# Patient Record
Sex: MALE | Race: WHITE
[De-identification: names, ages, dates, MRNs, and addresses within clinical notes are randomized per-mention and may not be internally consistent; named-entity substitution may affect disease eponyms.]

---

## 2019-01-18 ENCOUNTER — HOSPITAL ENCOUNTER (OUTPATIENT)
Dept: HOSPITAL 80 - CIMAGING | Age: 39
Discharge: HOME | End: 2019-01-18
Attending: INTERNAL MEDICINE
Payer: COMMERCIAL

## 2019-01-18 DIAGNOSIS — R93.1: ICD-10-CM

## 2019-01-18 DIAGNOSIS — I51.7: ICD-10-CM

## 2019-01-18 DIAGNOSIS — R79.89: Primary | ICD-10-CM

## 2019-01-24 ENCOUNTER — HOSPITAL ENCOUNTER (INPATIENT)
Dept: HOSPITAL 80 - FCATH | Age: 39
LOS: 5 days | Discharge: HOME | DRG: 286 | End: 2019-01-29
Attending: INTERNAL MEDICINE | Admitting: INTERNAL MEDICINE
Payer: COMMERCIAL

## 2019-01-24 DIAGNOSIS — F10.20: ICD-10-CM

## 2019-01-24 DIAGNOSIS — G47.33: ICD-10-CM

## 2019-01-24 DIAGNOSIS — I50.21: ICD-10-CM

## 2019-01-24 DIAGNOSIS — I38: ICD-10-CM

## 2019-01-24 DIAGNOSIS — E66.9: ICD-10-CM

## 2019-01-24 DIAGNOSIS — E87.1: ICD-10-CM

## 2019-01-24 DIAGNOSIS — F11.21: ICD-10-CM

## 2019-01-24 DIAGNOSIS — I11.0: Primary | ICD-10-CM

## 2019-01-24 LAB
INR PPP: 1.27 (ref 0.83–1.16)
PLATELET # BLD: 193 10^3/UL (ref 150–400)
PROTHROMBIN TIME: 16.1 SEC (ref 12–15)

## 2019-01-24 PROCEDURE — C1760 CLOSURE DEV, VASC: HCPCS

## 2019-01-24 RX ADMIN — FUROSEMIDE SCH MG: 10 INJECTION, SOLUTION INTRAMUSCULAR; INTRAVENOUS at 16:44

## 2019-01-24 NOTE — CPIP
DATE OF PROCEDURE:  01/24/2019



PROCEDURE:  

1.  Coronary angiography.

2.  Left ventriculography.

3.  Right heart catheterization.



INDICATION:  

1.  Cardiomyopathy with an ejection fraction of 10% by echocardiography. 

2.  Congestive heart failure, which would be class III.



ACCESS:  Patient was prepped and draped in sterile fashion.  1% lidocaine was used to anesthetize the
 right inguinal region.  A 6-St Lucian introducer sheath was placed selectively into the right common fe
moral artery via modified Seldinger technique.  A 7-St Lucian introducer sheath was placed selectively i
n the common femoral vein via modified Seldinger technique.



CORONARY ANGIOGRAPHY:  A 6-St Lucian JL4 was advanced to the left main coronary artery and images obtain
ed.  The left main coronary artery trifurcated into an LAD, ramus, and circumflex coronary arteries. 
 The left main coronary artery appeared normal.  The left anterior descending coronary artery gave ri
se to 1 prominent diagonal branch.  The left anterior descending coronary artery and its diagonal bra
nch appeared normal.  The ramus coronary artery is a moderate-to-large sized vessel.  The ramus coron
karlene artery appeared normal.  The circumflex coronary artery was a large vessel, but was nondominant. 
 The circumflex coronary artery gave rise to 2 prominent OM branches.  The circumflex coronary artery
 and its OM branches appeared normal.  



A 6-St Lucian JR4 was advanced to the right coronary artery and images obtained.  The right coronary art
abraham is dominant.  The right coronary artery appeared normal.



LEFT VENTRICULOGRAPHY:  A 6-St Lucian pigtail catheter was advanced in the left ventricle and images obt
ained.  The left ventricle was dilated in size with severely reduced systolic function.  Estimated ej
ection fraction was 10%.  The left ventricular end-diastolic pressure was elevated at 40 mmHg.



RIGHT HEART CATHETERIZATION:  A pulmonary artery catheter were advanced in the right atrium and press
ure obtained.  The right atrial pressure was 26 mmHg.  The catheter was then advanced in the right ve
ntricle and pressure obtained.  The right ventricular pressure was 63/8 mmHg.  The catheter was then 
advanced in the pulmonary artery position and pressure and sat obtained.  The pulmonary artery pressu
re was 63/31 mmHg.  The pulmonary artery saturation was 70.9%.  The catheter was then advanced in wed
ge position and pressure obtained.  The pulmonary capillary wedge pressure was 26 mmHg.  The femoral 
artery saturation was 89.9%.  Cardiac output was 8.1 L/minute.  Cardiac index 3.22.



COMPLICATIONS:  None.



CONCLUSIONS:  

1.  Normal coronary arteries.

2.  Severely reduced left ventricular systolic function, elevated pulmonary capillary wedge pressure,
 and left ventricular end-diastolic pressure consistent with congestive heart failure.





Job #:  [id number]

## 2019-01-24 NOTE — CPEKG
Test Reason : OPEN

Blood Pressure : ***/*** mmHG

Vent. Rate : 114 BPM     Atrial Rate : 114 BPM

   P-R Int : 164 ms          QRS Dur : 099 ms

    QT Int : 362 ms       P-R-T Axes : 053 110 021 degrees

   QTc Int : 499 ms

 

Sinus tachycardia

Probable left atrial enlargement

Right axis deviation

Borderline prolonged QT interval

 

Confirmed by Mili Bruner ([id number]) on 1/24/2019 5:46:48 PM

 

Referred By: Donavan Ramirez           Confirmed By:Mili Bruner

## 2019-01-24 NOTE — PDHOSCONS
<Yari Moon - Last Filed: 01/24/19 20:56>





History and Physical





- Chief Complaint


Alcohol abuse





- History of Present Illness


This is a 40 y/o male with history of opiate and alcohol abuse disorder 

presenting with approximately 30 lbs weight gain in the last month and 

bilateral lower extremity edema.  Hospital medicine has been asked to consult.





He presented to cardiology clinic (Dr. Scanlon) on 1/18/19 because of weight gain

, abdominal distention, pedal edema as well as apneic epidoes at night.  He has 

increased fatigue and decreased appetite.  D-dimer at time of evaluation was 

elevated.  He had a chest CTA and was negative for PE.  He followed up with 

recommended ECHO which revealed cardiomyopathy with EF of 10% and congestive 

heart failure class III. Today he was cathed and results were normal coronary 

arteries however severely reduced left ventricular systolic function, elevated 

pulmonary capillary wedge pressure, and left ventricular end-diastolic pressure 

consistent with congestive heart failure.





The pt has a long history of opiate and alcohol abuse.  In 2012, he sought 

inpatient help and then outpatient rehabilitation for opiate and alcohol abuse 

but he left early due to unknown reasons. He reportedly is no longer using 

opiates and uses Suboxone for multiple chronic pain issues.  He continues to 

abuse alcohol to a great extent - he drinks 1.75L of vodka every 2-3 days and 

his wife has found more alcohol in his bags.





Past Medical/Surgical History


1. Alcohol use disorder


2. Hx of opiate abuse


3. Hypertension


4. ADHD


5. Suspected Obstructive Sleep Apnea





Social


1. , employed at a [employer], desk job


2. Denies tobacco or illicit drug use. Heavy alcohol use but denies amount wife 

reported  





History Information





- Allergies/Home Medication List


Allergies/Adverse Reactions: 








No Known Allergies Allergy (Verified 01/22/19 10:15)


 





Home Medications: 








Amphet Asp and D/Amphet [Adderall 20 mg (*)] 20 mg PO TID PRN 09/29/11 [Last 

Taken 01/24/19]


Buprenorphine HCl/Naloxone HCl [Suboxone 8 mg-2 mg Sl Film] 1 each SL HS 01/22/ 19 [Last Taken 01/24/19]





I have personally reviewed and updated: family history, medical history, social 

history, surgical history


Past Medical History: See HPI list





- Surgical History


Additional surgical history: See HPI list





- Family History


Positive for: myocardial infarction


Additional family history: Heart failure





- Social History


Smoking Status: Never smoked


Alcohol Use: Heavy


Drug Use: None





Review of Systems


Review of Systems: 





ROS: 10pt was reviewed & negative except for what was stated in HPI & below


Constitutional: Reports: other (Fatigue; weight gain)


EENMT: Reports: no symptoms


Cardiac: Reports: no symptoms


Respiratory: Reports: no symptoms


Gastrointestinal: Reports: abdominal distention


Genitourinary: Reports: no symptoms


Muscolosketal: Reports: back pain (Chronic)


Skin: Reports: no symptoms


Neurological: Reports: emotional problems


Hematologic/Lymphatic: Reports: no symptoms


Immunologic/Allergy: Reports: no symptoms





Physical Exam


Physical Exam: 


He refused physical examination.  The pt was doing miscellaneous tasks in his 

room when I saw him (putting his belongings away, fidgeting), appeared tired, 

in no apparent distress, no pain. 














Temp Pulse Resp BP Pulse Ox


 


    107 H  16   140/107 H  90 L


 


    01/24/19 16:47  01/24/19 16:47  01/24/19 16:47  01/24/19 16:47














Lab Data & Imaging Review





 01/24/19 11:15





 01/24/19 18:00














WBC  6.73 10^3/uL (3.80-9.50)   01/24/19  11:15    


 


RBC  4.82 10^6/uL (4.40-6.38)   01/24/19  11:15    


 


Hgb  16.4 g/dL (13.7-17.5)   01/24/19  11:15    


 


Hct  48.9 % (40.0-51.0)   01/24/19  11:15    


 


MCV  101.5 fL (81.5-99.8)  H  01/24/19  11:15    


 


MCH  34.0 pg (27.9-34.1)   01/24/19  11:15    


 


MCHC  33.5 g/dL (32.4-36.7)   01/24/19  11:15    


 


RDW  12.6 % (11.5-15.2)   01/24/19  11:15    


 


Plt Count  193 10^3/uL (150-400)   01/24/19  11:15    


 


MPV  9.2 fL (8.7-11.7)   01/24/19  11:15    


 


Neut % (Auto)  58.2 % (39.3-74.2)   01/24/19  11:15    


 


Lymph % (Auto)  23.2 % (15.0-45.0)   01/24/19  11:15    


 


Mono % (Auto)  16.8 % (4.5-13.0)  H  01/24/19  11:15    


 


Eos % (Auto)  0.9 % (0.6-7.6)   01/24/19  11:15    


 


Baso % (Auto)  0.6 % (0.3-1.7)   01/24/19  11:15    


 


Nucleat RBC Rel Count  0.0 % (0.0-0.2)   01/24/19  11:15    


 


Absolute Neuts (auto)  3.92 10^3/uL (1.70-6.50)   01/24/19  11:15    


 


Absolute Lymphs (auto)  1.56 10^3/uL (1.00-3.00)   01/24/19  11:15    


 


Absolute Monos (auto)  1.13 10^3/uL (0.30-0.80)  H  01/24/19  11:15    


 


Absolute Eos (auto)  0.06 10^3/uL (0.03-0.40)   01/24/19  11:15    


 


Absolute Basos (auto)  0.04 10^3/uL (0.02-0.10)   01/24/19  11:15    


 


Absolute Nucleated RBC  0.00 10^3/uL (0-0.01)   01/24/19  11:15    


 


Immature Gran %  0.3 % (0.0-1.1)   01/24/19  11:15    


 


Immature Gran #  0.02 10^3/uL (0.00-0.10)   01/24/19  11:15    


 


PT  16.1 SEC (12.0-15.0)  H  01/24/19  11:15    


 


INR  1.27  (0.83-1.16)  H  01/24/19  11:15    


 


Sodium  144 mEq/L (135-145)   01/24/19  11:15    


 


Potassium  4.8 mEq/L (3.5-5.2)   01/24/19  18:00    


 


Chloride  107 mEq/L ()   01/24/19  11:15    


 


Carbon Dioxide  26 mEq/l (22-31)   01/24/19  11:15    


 


Anion Gap  11 mEq/L (6-14)   01/24/19  11:15    


 


BUN  15 mg/dL (7-23)   01/24/19  11:15    


 


Creatinine  0.7 mg/dL (0.7-1.3)   01/24/19  11:15    


 


Estimated GFR  > 60   01/24/19  11:15    


 


Glucose  85 mg/dL ()   01/24/19  11:15    


 


Calcium  9.1 mg/dL (8.5-10.4)   01/24/19  11:15    


 


Phosphorus  4.5 mg/dL (2.5-4.5)   01/24/19  18:00    


 


Magnesium  1.6 mg/dL (1.6-2.3)   01/24/19  18:00    


 


Triglycerides  72 mg/dL ()   01/24/19  11:15    


 


Cholesterol  167 mg/dL (140-200)   01/24/19  11:15    


 


Cholesterol Risk Factr  0.5  (0.2-1.0)   01/24/19  11:15    


 


LDL Cholesterol, Calc  101 mg/dL ()  H  01/24/19  11:15    


 


LDL Risk Factor  0.8  (0.2-1.0)   01/24/19  11:15    


 


VLDL Cholesterol  14 mg/dL (8-25)   01/24/19  11:15    


 


Non-HDL Cholesterol  115 mg/dL ()   01/24/19  11:15    


 


HDL Cholesterol  52 mg/dL (40-65)   01/24/19  11:15    


 


LDL/HDL Ratio  1.93 RATIO (1.00-3.64)   01/24/19  11:15    


 


Cholesterol/HDL Ratio  3.21 RATIO (1.00-4.97)   01/24/19  11:15    











Assessment & Plan


Plan: 


40 y/o male with history of alcohol use disorder went to the cath lab today 

with results of ejection fraction of 10%, cardiomyopathy, and congestive heart 

failure.





1. Reduced EF CHF: multi-factorial however do believe his heavy alcohol use is 

a major contributing factor





2. Alcohol abuse disorder: I spoke at length with the pt regarding the 

detrimental effects his alcohol use is putting on his body including his heart.

  He knows he should quit but he does not want to begin tonight.  He was 

dismissive of my attempts to educate him.  Dr. Ramirez ordered vodka for him to 

take tonight so he does not withdrawal.


-CIWA scale initiated


-Cont tele/pulse ox monitoring


-Ativan and valium PRN





3. Suspected sleep apnea: He should follow up outpatient with a pulmonologist 

to have a sleep study performed.





Diet: Cardiac


Code: Full








<Manjit Jay - Last Filed: 01/24/19 21:31>





History and Physical





- History of Present Illness








Review of Systems


Review of Systems: 








Physical Exam


Physical Exam: 

















Temp Pulse Resp BP Pulse Ox


 


 36.8 C   108 H  16   136/96 H  93 


 


 01/24/19 20:00  01/24/19 20:00  01/24/19 20:00  01/24/19 20:00  01/24/19 20:00














Lab Data & Imaging Review





 01/24/19 11:15





 01/24/19 18:00














WBC  6.73 10^3/uL (3.80-9.50)   01/24/19  11:15    


 


RBC  4.82 10^6/uL (4.40-6.38)   01/24/19  11:15    


 


Hgb  16.4 g/dL (13.7-17.5)   01/24/19  11:15    


 


Hct  48.9 % (40.0-51.0)   01/24/19  11:15    


 


MCV  101.5 fL (81.5-99.8)  H  01/24/19  11:15    


 


MCH  34.0 pg (27.9-34.1)   01/24/19  11:15    


 


MCHC  33.5 g/dL (32.4-36.7)   01/24/19  11:15    


 


RDW  12.6 % (11.5-15.2)   01/24/19  11:15    


 


Plt Count  193 10^3/uL (150-400)   01/24/19  11:15    


 


MPV  9.2 fL (8.7-11.7)   01/24/19  11:15    


 


Neut % (Auto)  58.2 % (39.3-74.2)   01/24/19  11:15    


 


Lymph % (Auto)  23.2 % (15.0-45.0)   01/24/19  11:15    


 


Mono % (Auto)  16.8 % (4.5-13.0)  H  01/24/19  11:15    


 


Eos % (Auto)  0.9 % (0.6-7.6)   01/24/19  11:15    


 


Baso % (Auto)  0.6 % (0.3-1.7)   01/24/19  11:15    


 


Nucleat RBC Rel Count  0.0 % (0.0-0.2)   01/24/19  11:15    


 


Absolute Neuts (auto)  3.92 10^3/uL (1.70-6.50)   01/24/19  11:15    


 


Absolute Lymphs (auto)  1.56 10^3/uL (1.00-3.00)   01/24/19  11:15    


 


Absolute Monos (auto)  1.13 10^3/uL (0.30-0.80)  H  01/24/19  11:15    


 


Absolute Eos (auto)  0.06 10^3/uL (0.03-0.40)   01/24/19  11:15    


 


Absolute Basos (auto)  0.04 10^3/uL (0.02-0.10)   01/24/19  11:15    


 


Absolute Nucleated RBC  0.00 10^3/uL (0-0.01)   01/24/19  11:15    


 


Immature Gran %  0.3 % (0.0-1.1)   01/24/19  11:15    


 


Immature Gran #  0.02 10^3/uL (0.00-0.10)   01/24/19  11:15    


 


PT  16.1 SEC (12.0-15.0)  H  01/24/19  11:15    


 


INR  1.27  (0.83-1.16)  H  01/24/19  11:15    


 


Sodium  144 mEq/L (135-145)   01/24/19  11:15    


 


Potassium  4.8 mEq/L (3.5-5.2)   01/24/19  18:00    


 


Chloride  107 mEq/L ()   01/24/19  11:15    


 


Carbon Dioxide  26 mEq/l (22-31)   01/24/19  11:15    


 


Anion Gap  11 mEq/L (6-14)   01/24/19  11:15    


 


BUN  15 mg/dL (7-23)   01/24/19  11:15    


 


Creatinine  0.7 mg/dL (0.7-1.3)   01/24/19  11:15    


 


Estimated GFR  > 60   01/24/19  11:15    


 


Glucose  85 mg/dL ()   01/24/19  11:15    


 


Calcium  9.1 mg/dL (8.5-10.4)   01/24/19  11:15    


 


Phosphorus  4.5 mg/dL (2.5-4.5)   01/24/19  18:00    


 


Magnesium  1.6 mg/dL (1.6-2.3)   01/24/19  18:00    


 


Triglycerides  72 mg/dL ()   01/24/19  11:15    


 


Cholesterol  167 mg/dL (140-200)   01/24/19  11:15    


 


Cholesterol Risk Factr  0.5  (0.2-1.0)   01/24/19  11:15    


 


LDL Cholesterol, Calc  101 mg/dL ()  H  01/24/19  11:15    


 


LDL Risk Factor  0.8  (0.2-1.0)   01/24/19  11:15    


 


VLDL Cholesterol  14 mg/dL (8-25)   01/24/19  11:15    


 


Non-HDL Cholesterol  115 mg/dL ()   01/24/19  11:15    


 


HDL Cholesterol  52 mg/dL (40-65)   01/24/19  11:15    


 


LDL/HDL Ratio  1.93 RATIO (1.00-3.64)   01/24/19  11:15    


 


Cholesterol/HDL Ratio  3.21 RATIO (1.00-4.97)   01/24/19  11:15    











Assessment & Plan


Assessment: 








CHF (congestive heart failure) (Acute)








Plan: 





Patient seen and evaluated independently, care plan reviewed with SHELIA Moon and 

agree with her assessment and plan as outlined above. Please see separate note 

for further details

## 2019-01-24 NOTE — HOSPPROG
Hospitalist Progress Note


Assessment/Plan: 





40 yo M with PMH of alcohol abuse as well as opiate abuse in remission 

currently on suboxone presenting to cardiology with complaints of 30 pound 

weight gain over the last month with abdominal and lower extremity edema found 

to have an EF of 10%





# acute systolic heart failure: with EF of 10% and what appears to be a dilated 

CM on echo, taken for cath today and no significant CAD noted, this is all 

likely related to etoh abuse. Patient admitted for diuresis and started on 

lasix 40mg IVP daily for now and will monitor I's/O's. No significant pulmonary 

edema noted on CT imaging and patient without significant hypoxia. Will need to 

be started on ACE or ARB but holding off on that for now per cardiology pending 

diuresis


# dilated CM: as above


# alcohol use disorder, severe, and alcohol withdrawal: patient defensive and 

not very interested in discussing importance of quitting drinking at this point

, states he has heard it from too many people already, started on ciwa with 

addition of 1 vodka drink daily per cardiology as well as mvi/thiamine/folate. 

Will need to be an ongoing discussion and patient would likely benefit from IP 

treatment if he would be amenable


# opiate use disorder in remission: continued on suboxone


# jing: will need formal sleep study as an OP


# IP status, will need > 48 hours stay for eval/mgmt of above


Patient new to my care. Old records reviewed and summarized as above. Care plan 

reviewed with cardiology as well as SHELIA Moon, please see her H&P consult for 

further details. 


Objective: 


 Vital Signs











Temp Pulse Resp BP Pulse Ox


 


 36.8 C   108 H  16   136/96 H  93 


 


 01/24/19 20:00  01/24/19 20:00  01/24/19 20:00  01/24/19 20:00  01/24/19 20:00








 Laboratory Results





 01/24/19 11:15 





 01/24/19 18:00 





 











 01/23/19 01/24/19 01/25/19





 05:59 05:59 05:59


 


Output Total   800


 


Balance   -800








 











PT  16.1 SEC (12.0-15.0)  H  01/24/19  11:15    


 


INR  1.27  (0.83-1.16)  H  01/24/19  11:15    














ICD10 Worksheet


Patient Problems: 


 Problems











Problem Status Onset


 


CHF (congestive heart failure) Acute

## 2019-01-24 NOTE — SOAPPROG
SOAP Progress Note


Assessment/Plan: 


Assessment:


























Plan:





Objective: 





 Vital Signs











Temp Pulse Resp BP Pulse Ox


 


       14   135/92 H  94 


 


       01/24/19 15:55  01/24/19 15:46  01/24/19 15:55








 Laboratory Results





 01/24/19 11:15 





 01/24/19 11:15 





 











PT  16.1 SEC (12.0-15.0)  H  01/24/19  11:15    


 


INR  1.27  (0.83-1.16)  H  01/24/19  11:15

## 2019-01-25 LAB — PLATELET # BLD: 200 10^3/UL (ref 150–400)

## 2019-01-25 RX ADMIN — LISINOPRIL SCH MG: 5 TABLET ORAL at 16:28

## 2019-01-25 RX ADMIN — THIAMINE HYDROCHLORIDE SCH MLS: 100 INJECTION, SOLUTION INTRAMUSCULAR; INTRAVENOUS at 08:04

## 2019-01-25 RX ADMIN — MENTHOL SCH PATCH: 1 SPRAY TOPICAL at 17:40

## 2019-01-25 RX ADMIN — Medication SCH ML: at 16:28

## 2019-01-25 RX ADMIN — Medication SCH ML: at 21:56

## 2019-01-25 RX ADMIN — FUROSEMIDE SCH MG: 10 INJECTION, SOLUTION INTRAMUSCULAR; INTRAVENOUS at 08:04

## 2019-01-25 NOTE — HOSPPROG
Hospitalist Progress Note


Assessment/Plan: 


38 yo M with PMH of alcohol abuse as well as opiate abuse in remission 

currently on suboxone presenting to cardiology with complaints of 30 pound 

weight gain over the last month with abdominal and lower extremity edema found 

to have an EF of 10%.





Acute systolic heart failure-  Echo with LVEF of 10%. Cath with no obstructive 

CAD. likely 2/2 to heavy alcohol use. Case discussed [facility] cardiology who 

recommends medical optimization, hold bb for now. 


-contine aggressive diuresis


-ace added today


-stop etoh


-cards following





Etoh Dependence- he admits he is not ready to quit but thinks he will "slow down

". CIWA ordered and getting TID vodka. 





opiate use disorder in remission: continued on suboxone





jing: will need formal sleep study as an OP





 IP status, will need > 48 hours stay for eval/mgmt of above


Fluids-None


Lytes- WNL


Nutrition- cardiac


Cor- Full


Dispo- inpatinet for CHF, etoh wd





patient new to my care. chart reviewed. 





 





Subjective: patient admits he feels very anxious this morning. tremulous hands. 

honestly not interested in quitting.


Objective: 


 Vital Signs











Temp Pulse Resp BP Pulse Ox


 


 36.6 C   73   12   138/74 H  96 


 


 01/25/19 11:13  01/25/19 11:13  01/25/19 11:13  01/25/19 11:13  01/25/19 11:13








 Laboratory Results





 01/25/19 03:20 





 01/25/19 03:20 





 











 01/24/19 01/25/19 01/26/19





 05:59 05:59 05:59


 


Intake Total  700 


 


Output Total  800 


 


Balance  -100 








 











PT  16.1 SEC (12.0-15.0)  H  01/24/19  11:15    


 


INR  1.27  (0.83-1.16)  H  01/24/19  11:15    














- Physical Exam


Constitutional: no apparent distress, appears nourished, not in pain


Eyes: PERRL, anicteric sclera, EOMI


Ears, Nose, Mouth, Throat: moist mucous membranes, hearing normal, ears appear 

normal, no oral mucosal ulcers


Cardiovascular: no murmur, rub, or gallop, tachycardia, edema


Respiratory: no respiratory distress, no rales or rhonchi, clear to auscultation


Gastrointestinal: normoactive bowel sounds, soft, non-tender abdomen, no 

palpable masses


Genitourinary: no bladder fullness, no bladder tenderness, no renal bruits


Skin: no rashes or abrasions, no fluctuance, no induration


Musculoskeletal: full muscle strength, no muscle tenderness, normal joint ROM


Neurologic: AAOx3, sensation intact bilaterally


Psychiatric: interacting appropriately, not encephalopathic, anxious, other (

appears anxious and tremulous. )


Lymph, Heme, Immunologic: no cervical LAD, no supraclavicular LAD





ICD10 Worksheet


Patient Problems: 


 Problems











Problem Status Onset


 


CHF (congestive heart failure) Acute

## 2019-01-25 NOTE — PDMN
Medical Necessity


Medical necessity: Pt meets inpt criteria per MD order and MCG M-190, Heart 

Failure. 40 y/o presenting w/abd and LE edema and 30# wt gain over past month, 

underwent R and L heart cath and found to have EF 10% w/severely reduced L 

ventricular systolic function, ECHO suggests dilated CM,  tachycardic, admitted 

w/acute CHF requiring IV Lasix diuresis. PMHx of opiate abuse in remission 

currently- on Suboxone for  mult chronic pain issues, past and current alcohol 

abuse. Est LOS>2MN for eval/management of above.

## 2019-01-25 NOTE — ASMTCMCOM
CM Note

 

CM Note                       

Notes:

1/25/2019 Case Management Note



Pt admitted for CHF and ETOH withdrawl.  Met w/pt in the morning.  Pt unwilling to engage in 

conversation about alcohol consumption.  Pt has completed inpatient rehab twice at [facility] 

approximately 6 years ago.  Pt is currently on suboxone through Dr. Carlos Jay In [place].   At this 

time pt refuses resources and does not want to stop drinking.



Met w/janie Elise [phone].  Elise reports pt achieved sobriety for 3 - 6 months after [facility] 

treatment and then started using alcohol.  Currently Elise estimates pt consumes 3 handles of vodka 

per week.  Pt drinks round the clock, per Elsie, mixing vodka into gatorade during the day and 

switching to cocktails late in the afternoon. 



Arranged Palliative Care meeting with Janice for pt to discuss goals of care. 



Encouraged wife to use insurance benefits to find counselor for support.  Pt and wife are strong in 


their holli and plan to rely on their  for guidance.  Pt mother lives in [place] and is 

support for both, father passed away in his early 60's.



Wife is hopeful pt will consider inpatient rehab.  Wife explored [employer] coverage and pt has 

inpatient options including [facility], [place], [facility], [facility], [facility], [facility].  Informed wife that pt 

has to call and complete assessment screening for admittance to a program.



Case Management d/c poc:  to be determined.

 

 

Date Signed:  01/25/2019 04:22 PM

Electronically Signed By:Monalisa Winchester RN

## 2019-01-25 NOTE — SOAPPROG
SOAP Progress Note


Assessment/Plan: 


1. cardiomyopathy - patient has a nonischemic cardiomyopathy.  Angiogram on 1/24 /2019 demonstrated normal coronary arteries.  Echocardiogram on 1/21/2019 

demonstrated moderate mitral regurgitation and severe tricuspid regurgitation.  

No prolong tachy arrhythmias noted on telemetry monitoring.  Patient does have 

a history of significant alcohol intake.  Suspect alcohol induced 

cardiomyopathy.  His LVEF is approximately 10%.  Patient reports approximately 

a 30 lb weight gain over the last month.  Patient has diuresed approximately 8 

kg.  He remains volume overloaded.


--> Continue diuresis


--> Start lisinopril 5 mg daily


--> Pt has appointment with Dr. Haynes on Monday at 10 am.


--> Consider coreg with CHF stable





2. HTN - BP remains moderately elevated.  Will start lisinopril 5 mg daily.





3. ETOH use/abuse - Patient is not interested in cessation at this time.  

Appreciate hospitalist input.


01/25/19 13:09





Subjective: 


No chest pain.


Positive dyspnea


Difficulty sleeping last night


Edema improved


Objective: 





 Vital Signs











Temp Pulse Resp BP Pulse Ox


 


 36.6 C   73   12   138/74 H  96 


 


 01/25/19 11:13  01/25/19 11:13  01/25/19 11:13  01/25/19 11:13  01/25/19 11:13








 Laboratory Results





 01/25/19 03:20 





 01/25/19 03:20 





 











 01/24/19 01/25/19 01/26/19





 05:59 05:59 05:59


 


Intake Total  700 


 


Output Total  800 


 


Balance  -100 








 











PT  16.1 SEC (12.0-15.0)  H  01/24/19  11:15    


 


INR  1.27  (0.83-1.16)  H  01/24/19  11:15    














Physical Exam





- Physical Exam


General Appearance: alert, no apparent distress


Respiratory: crackles


Cardiac/Chest: regular rate, rhythm, tachycardia


Abdomen: other (Distended)


Extremities: pedal edema


Neuro/Psych: other (Tremulous)





ICD10 Worksheet


Patient Problems: 


 Problems











Problem Status Onset


 


CHF (congestive heart failure) Acute

## 2019-01-26 RX ADMIN — CARVEDILOL SCH MG: 3.12 TABLET, FILM COATED ORAL at 17:57

## 2019-01-26 RX ADMIN — FUROSEMIDE SCH MG: 10 INJECTION, SOLUTION INTRAMUSCULAR; INTRAVENOUS at 09:12

## 2019-01-26 RX ADMIN — Medication SCH ML: at 16:06

## 2019-01-26 RX ADMIN — Medication SCH MG: at 11:49

## 2019-01-26 RX ADMIN — MENTHOL SCH PATCH: 1 SPRAY TOPICAL at 16:17

## 2019-01-26 RX ADMIN — THIAMINE HYDROCHLORIDE SCH MLS: 100 INJECTION, SOLUTION INTRAMUSCULAR; INTRAVENOUS at 10:27

## 2019-01-26 RX ADMIN — Medication SCH ML: at 09:11

## 2019-01-26 RX ADMIN — CARVEDILOL SCH MG: 3.12 TABLET, FILM COATED ORAL at 09:11

## 2019-01-26 RX ADMIN — SPIRONOLACTONE SCH MG: 25 TABLET, FILM COATED ORAL at 09:12

## 2019-01-26 RX ADMIN — FUROSEMIDE SCH MG: 10 INJECTION, SOLUTION INTRAMUSCULAR; INTRAVENOUS at 16:06

## 2019-01-26 RX ADMIN — Medication SCH ML: at 20:58

## 2019-01-26 RX ADMIN — LISINOPRIL SCH MG: 5 TABLET ORAL at 09:12

## 2019-01-26 NOTE — HOSPPROG
Hospitalist Progress Note


Assessment/Plan: 


38 yo M with PMH of alcohol abuse as well as opiate abuse in remission 

currently on suboxone presenting to cardiology with complaints of 30 pound 

weight gain over the last month with abdominal and lower extremity edema found 

to have an EF of 10%.





Acute systolic heart failure-  Echo with LVEF of 10%. Cath with no obstructive 

CAD. likely 2/2 to heavy alcohol use.  Iron panel ordered to rule out 

hemochromatosis and his ferritin is 473 which is quite high.  His iron 

saturation is 45%.  Depending on which societies guidelines you chose to follow 

this could be suggestive of hemochromatosis.  Oddly enough he does not have 

underlying liver disease, diabetes or classic bronzing of the skin.  Will need 

to evaluate further.  Case discussed with cardiology who recommends medical 

optimization, start trial of low-dose beta-blocker today


-contine aggressive diuresis


-ace added yesterday


-trial of Coreg today


-stop etoh


-cards following





Etoh Dependence- he admits he is not ready to quit but thinks he will "slow down

". CIWA ordered and getting TID vodka. 





opiate use disorder in remission: continued on suboxone





jing: will need formal sleep study as an OP





 IP status, will need > 48 hours stay for eval/mgmt of above


Fluids-None


Lytes- WNL


Nutrition- cardiac


Cor- Full


Dispo- inpatinet for CHF, etoh wd








 





Objective: 


 Vital Signs











Temp Pulse Resp BP Pulse Ox


 


 37.0 C   124 H  20   148/104 H  93 


 


 01/26/19 11:51  01/26/19 11:51  01/26/19 11:51  01/26/19 11:51  01/26/19 11:51








 Laboratory Results





 01/25/19 03:20 





 01/26/19 03:25 





 











 01/25/19 01/26/19 01/27/19





 05:59 05:59 05:59


 


Intake Total 700 650 


 


Output Total 800 775 750


 


Balance -100 -125 -750








 











PT  16.1 SEC (12.0-15.0)  H  01/24/19  11:15    


 


INR  1.27  (0.83-1.16)  H  01/24/19  11:15    














- Physical Exam


Constitutional: no apparent distress, appears nourished, not in pain


Eyes: PERRL, anicteric sclera, EOMI


Ears, Nose, Mouth, Throat: moist mucous membranes, hearing normal, ears appear 

normal, no oral mucosal ulcers


Cardiovascular: no murmur, rub, or gallop, tachycardia, edema


Respiratory: no respiratory distress, no rales or rhonchi, clear to auscultation


Gastrointestinal: normoactive bowel sounds, soft, non-tender abdomen, no 

palpable masses


Genitourinary: no bladder fullness, no bladder tenderness, no renal bruits


Skin: no rashes or abrasions, no fluctuance, no induration


Musculoskeletal: full muscle strength, no muscle tenderness, normal joint ROM


Neurologic: AAOx3, sensation intact bilaterally


Psychiatric: interacting appropriately, not anxious, not encephalopathic, 

thought process linear


Lymph, Heme, Immunologic: no cervical LAD, no supraclavicular LAD





ICD10 Worksheet


Patient Problems: 


 Problems











Problem Status Onset


 


CHF (congestive heart failure) Acute

## 2019-01-26 NOTE — PDCARPN
Cardiology Progress Note


Assessment/Plan: 


Assessment/plan:  39-year-old male with acute systolic heart failure, 

hypertension, probable sleep apnea, past opiate abuse and ongoing alcohol 

abuse.  Admitted from [facility]iology Clinic on 01/24 for right left heart 

catheterization after outpatient echo showed severely depressed ejection 

fraction of 10% and he was clinically in florid heart failure.  Catheterization 

demonstrated markedly elevated wedge pressure, preserved cardiac output, normal 

coronaries.  He has diuresed approximately 15 kg over the past 36 hr.





1. Acute systolic heart failure:  Etiology is likely related to heavy alcohol 

use, sleep apnea, and perhaps hypertension.  He does have valvular disease that 

I believe is secondary to annular dilation and not the primary etiology of his 

cardiomyopathy.  He is diuresing well with IV Lasix.  Will increase to twice 

daily dosing today and consider switching to oral diuretics tomorrow.  Today 

will add spironolactone.  He tolerated initiation of lisinopril yesterday.  

Given his ventricular triplet on telemetry, I would like to try low-dose Coreg, 

but we will need to follow him closely as he is likely relying on some of his 

tachycardia to maintain cardiac output.  Follow electrolytes closely.  He has 

required magnesium supplementation and I will also put him on daily oral 

magnesium.  Of note, TSH was normal on 01/18.  I have ordered iron studies to 

rule out hemochromatosis ptosis as an etiology of his heart failure.  Strict I&O

, daily weights, 1500 cc fluid restriction, and low-salt diet.





Approximately 30 min was spent in direct patient care today, reviewing the plan

, explaining rationale behind drug therapy, and trying to help the patient 

understand that long-term follow-up will be absolutely necessary for his 

improved cardiovascular health.  He does have a follow-up appointment with Dr. Leroy Null on January 28th at 10:00 a.m..





2. Alcohol abuse:  In the hospital he has required three times daily vodka and 

p.r.n. Ativan/CIWA.  Initially he was recalcitrant to the idea of alcohol 

cessation, but now says he would consider at some point.  He does understand 

that his alcohol abuse is significantly impacting his heart failure.  Continue 

thigh min.





3. Hypertension:  Lisinopril.  Trial of low-dose Coreg.





4. Valvular heart disease:  This is likely secondary to his LV dilation.  Will 

need serial follow-up.





5. Likely sleep apnea:  Outpatient evaluation.  








01/26/19 08:03





Subjective: 





He denies significant dyspnea.  He states he feels fine when walking.  He is 

not dizzy.  He denies palpitations.  He is still sleeping with the head of the 

bed at 30 degrees.  He reports that overall as lower extremity edema has 

improved since admission.


Reviewed/Discussed With: multidisciplinary team


Time Spent with Patient: greater than 25 minutes


Time Spent with Patient: Greater than 25 minutes spent on this patients care, 

greater than 50% of time spent counseling, educating, and coordinating care 

regarding the above mentioned plan.


Objective: 





 Vital Signs (8 Hrs)











  Temp Pulse Resp BP Pulse Ox


 


 01/26/19 04:00  37.0 C  120 H  16  142/101 H  93








 Intake/Output (24 Hrs)











 01/25/19 01/26/19 01/27/19





 05:59 05:59 05:59


 


Intake Total 700 650 


 


Output Total 800 775 


 


Balance -100 -125 


 


Intake:   


 


  Oral (ml) 700 550 


 


  IV Infused (ml)  100 


 


    Magnesium Sulf 1 gm/  100 





    Dextrose 100 ml @ 100 mls   





    /hr IV ONCE ONE Rx#:   





    [id number]   


 


Output:   


 


  Urine (ml) 800 775 


 


    Toilet 800  


 


    Urinal  775 


 


Other:   


 


  Weight 125.191 kg 115.3 kg 


 


  Intake Quantity  Yes 





  Sufficient   


 


  Output Comment   


 


    Toilet  Not compliant with I/O monitoring. 


 


  Number of Voids   


 


    Toilet 4 6 


 


    Urinal  1 








Slightly anxious.


JVP 12-14 cm of water.  Tachycardic regular rhythm without murmur or gallop


Lungs clear bilaterally without wheeze rhonchi rales


2+ pitting edema to the midshin bilaterally


Alert and oriented x3.  Slightly anxious.  No gross focal neurologic deficits.


Result Diagrams: 


 01/25/19 03:20





 01/26/19 03:25


EKG: 





Reviewed:  Sinus tachycardia with diffuse repolarization abnormalities


Telemetry: 





Sinus rhythm and sinus tachycardia with 1 ventricular triplet 1/24


Echocardiogram: 





Reviewed:  Severely depressed ejection fraction of 10% with severe global 

hypokinesis.  Moderate mitral regurgitation and severe tricuspid regurgitation.





ICD10 Worksheet


Patient Problems: 


 Problems











Problem Status Onset


 


CHF (congestive heart failure) Acute

## 2019-01-27 RX ADMIN — LISINOPRIL SCH MG: 5 TABLET ORAL at 10:47

## 2019-01-27 RX ADMIN — MENTHOL SCH PATCH: 1 SPRAY TOPICAL at 18:27

## 2019-01-27 RX ADMIN — THIAMINE HYDROCHLORIDE SCH MLS: 100 INJECTION, SOLUTION INTRAMUSCULAR; INTRAVENOUS at 13:50

## 2019-01-27 RX ADMIN — Medication SCH: at 12:26

## 2019-01-27 RX ADMIN — FUROSEMIDE SCH MG: 10 INJECTION, SOLUTION INTRAMUSCULAR; INTRAVENOUS at 10:45

## 2019-01-27 RX ADMIN — THIAMINE HYDROCHLORIDE SCH: 100 INJECTION, SOLUTION INTRAMUSCULAR; INTRAVENOUS at 12:28

## 2019-01-27 RX ADMIN — Medication SCH: at 17:37

## 2019-01-27 RX ADMIN — Medication SCH MG: at 16:53

## 2019-01-27 RX ADMIN — SPIRONOLACTONE SCH MG: 25 TABLET, FILM COATED ORAL at 10:48

## 2019-01-27 RX ADMIN — CARVEDILOL SCH MG: 3.12 TABLET, FILM COATED ORAL at 08:08

## 2019-01-27 RX ADMIN — CARVEDILOL SCH MG: 6.25 TABLET, FILM COATED ORAL at 18:26

## 2019-01-27 RX ADMIN — FUROSEMIDE SCH MG: 10 INJECTION, SOLUTION INTRAMUSCULAR; INTRAVENOUS at 16:53

## 2019-01-27 NOTE — ASMTCMCOM
CM Note

 

CM Note                       

Notes:

Chart reviewed. Met with patient to review plan of care. 39 year old male with fulminant heart 

failure thought to be likely alcohol induced. We discussed his treatment options available as far 

as alcohol withdrawal and that he has in fact replaced his previous opiate addiction with 

alcohol. CM stressed to him that success at home "self tapering" alcohol was not likely to be 

successful and carried significant risk to his health. He is feeling that he has no control but 

expressed clearly he wants to go home and not seek withdrawal support here or at other inpatient 

facility. He reports he see's a psychiatrist for care. He denies depression and does not believe he 


self medicates with ETOH. He is tremulous despite his current doses of alcohol. CM explained that 

his medical team would feel it in his best interest to go through active withdrawal under medical 

supervision but again he is resistant. I have suggested that he make an action plan to demonstrate 

a sincere intent to stop alcohol by seeking follow up and choosing a program to help him. CM to 

follow for need.



Plan: TBD

 

Date Signed:  01/27/2019 12:13 PM

Electronically Signed By:Clarice Bustamante RN

## 2019-01-27 NOTE — HOSPPROG
Hospitalist Progress Note


Assessment/Plan: 


40 yo M with PMH of alcohol abuse as well as opiate abuse in remission 

currently on suboxone presenting to cardiology with complaints of 30 pound 

weight gain over the last month with abdominal and lower extremity edema found 

to have an EF of 10%.





Acute systolic heart failure-  Echo with LVEF of 10%. Cath with no obstructive 

CAD. likely 2/2 to heavy alcohol use.  Did have elevated ferritin and iron 

saturation which will need to be evaluated further in the outpatient setting.  

He has tolerated aggressive diuresis well.  Started on low-dose Coreg yesterday 

along with lisinopril was she has also tolerated well.  Discussed the patient 

with Cardiology with current plan to up titrate medications as able and 

continue diuresis.


-contine aggressive diuresis


-cont ace


-increase coreg to 6.25 BID


-stop etoh


-daily weights, I/O, salt/fluid restriction


-cards following





Etoh Dependence- initially said he was not going to quit and so was ordered 

three times daily vodka.  Now saying he is going to go to detox after 

discharge.  I ultimately made the decision to stop the patient's vodka and put 

him on CIWA protocol with Ativan


-CIWA protocol


-daily thiamine folic acid


-patient wanted to leave today but I explained to him that he was still high 

risk for withdrawal and seizure and it would be against medical advice.





opiate use disorder in remission: continued on suboxone





jing: will need formal sleep study as an OP





 IP status, will need > 48 hours stay for eval/mgmt of above


Fluids-oral


Lytes- WNL


Nutrition- cardiac


Cor- Full


Dispo- inpatient for congestive heart failure and alcohol withdrawal








 





Subjective: Wants to leave today, says he is going to go to detox, no chest 

pain shortness of breath or other complaints


Objective: 


 Vital Signs











Temp Pulse Resp BP Pulse Ox


 


 36.7 C   115 H  23 H  121/81 H  86 L


 


 01/27/19 11:55  01/27/19 12:24  01/27/19 12:24  01/27/19 13:59  01/27/19 13:59








 Laboratory Results





 01/25/19 03:20 





 01/27/19 03:15 





 











 01/26/19 01/27/19 01/28/19





 05:59 05:59 05:59


 


Intake Total 650 1040 


 


Output Total 775 1550 1175


 


Balance -125 -510 -1175








 











PT  16.1 SEC (12.0-15.0)  H  01/24/19  11:15    


 


INR  1.27  (0.83-1.16)  H  01/24/19  11:15    














- Physical Exam


Constitutional: no apparent distress, appears nourished, not in pain


Eyes: PERRL, anicteric sclera, EOMI


Ears, Nose, Mouth, Throat: moist mucous membranes, hearing normal, ears appear 

normal, no oral mucosal ulcers


Cardiovascular: tachycardia, edema


Respiratory: no respiratory distress, no rales or rhonchi, clear to auscultation


Gastrointestinal: normoactive bowel sounds, soft, non-tender abdomen, no 

palpable masses


Genitourinary: no bladder fullness, no bladder tenderness, no renal bruits


Skin: no rashes or abrasions, no fluctuance, no induration


Musculoskeletal: full muscle strength, no muscle tenderness, normal joint ROM


Neurologic: AAOx3, sensation intact bilaterally


Psychiatric: interacting appropriately, not encephalopathic, thought process 

linear, anxious, poor insight, poor judgement


Lymph, Heme, Immunologic: no cervical LAD, no supraclavicular LAD





ICD10 Worksheet


Patient Problems: 


 Problems











Problem Status Onset


 


CHF (congestive heart failure) Acute Pt sent us a letter stating that in order for her supplies to be covered an addendum will need to be made or a letter written in regards to her wound supplies. Please refer to media for the specifics of what will need to be put in her chart note or letter.  Please advise!!

## 2019-01-27 NOTE — PDCARPN
Cardiology Progress Note


Assessment/Plan: 


Assessment/plan:  39-year-old male with acute systolic heart failure, 

hypertension, probable sleep apnea, past opiate abuse and ongoing alcohol 

abuse.  Admitted on 01/24 for right and left heart catheterization after 

outpatient echo showed severely depressed ejection fraction of 10% and he was 

clinically in florid heart failure.  Catheterization demonstrated markedly 

elevated wedge pressure, preserved cardiac output, normal coronaries.  He has 

diuresed approximately 12 kg since admission.





1. Acute systolic heart failure:  Etiology is likely related to heavy alcohol 

use, sleep apnea, and perhaps hypertension.  He does have valvular disease that 

I believe is secondary to annular dilation and not the primary etiology of his 

cardiomyopathy.  He is diuresing well with IV Lasix; spironolactone was added 

January 26th.  He is on low-dose lisinopril.  He tolerated low-dose Coreg 

yesterday.  Will increase dose today.  Of note, TSH was normal on 01/18.  His 

iron studies show an elevated ferritin and iron saturation 45%.  Would 

recommend outpatient hematology evaluation to definitively rule out 

hemochromatosis.  Strict I&O, daily weights, 1500 cc fluid restriction, and low-

salt diet.





2. Alcohol abuse:  In the hospital he has required three times daily vodka and 

p.r.n. Ativan/CIWA.  Initially he was recalcitrant to the idea of alcohol 

cessation, but now says he would consider.  He does understand that his alcohol 

abuse is significantly impacting his heart failure.  Best would be for him to 

stay and complete the process of coming off EtOH by stopping the alcohol, using 

ativan only and then tapering ativan.  We spent a long time discussing other 

options including inpatient rehab facility or intensive outpatient management 

of alcohol cessation.  The major concern is life threatening withdrawal or 

return to heavy drinking.  Continue thiamine.  





3. Hypertension:  Uptitrate coreg.  Continue lisinopril.  





4. Valvular heart disease:  This is likely secondary to his LV dilation.  Will 

need serial follow-up.





5. Likely sleep apnea:  Outpatient evaluation.  





Approximately 45 min was spent in direct patient care today, reviewing the plan

, explaining rationale behind drug therapy, and trying to help the patient 

understand that long-term follow-up will be absolutely necessary for his 

improved cardiovascular health.  Conference with patient and Dr. Lopez.  We 

have both recommended continuing inpatient care due to alcohol withdrawal and 

ongoing tachycardia.  





01/27/19 09:28





Subjective: 





Overall feeling better.  Denies dyspnea.  Not lightheaded.  He notes that his 

lower extremity edema is much improved.  He does admit that he is anxious and 

would like to go home.


Reviewed/Discussed With: hospitalist, multidisciplinary team


Time Spent with Patient: greater than 25 minutes


Time Spent with Patient: Greater than 25 minutes spent on this patients care, 

greater than 50% of time spent counseling, educating, and coordinating care 

regarding the above mentioned plan.


Objective: 





 Vital Signs (8 Hrs)











  Temp Pulse Resp BP Pulse Ox


 


 01/27/19 07:23  36.7 C  120 H  18  142/112 H  91 L


 


 01/27/19 07:15      87 L


 


 01/27/19 03:53  36.8 C  102 H  16  125/106 H  94








 Intake/Output (24 Hrs)











 01/26/19 01/27/19 01/28/19





 05:59 05:59 05:59


 


Intake Total 650 1040 


 


Output Total 775 1550 


 


Balance -125 -510 


 


Intake:   


 


  Oral (ml) 550 1040 


 


  IV Infused (ml) 100  


 


    Magnesium Sulf 1 gm/ 100  





    Dextrose 100 ml @ 100 mls   





    /hr IV ONCE ONE Rx#:   





    [id number]   


 


Output:   


 


  Urine (ml) 775 1550 


 


    Urinal 775 1550 


 


Other:   


 


  Weight 115.3 kg 113.1 kg 


 


  Intake Quantity Yes  





  Sufficient   


 


  Output Comment   


 


    Toilet Not compliant with I/O monitoring.  


 


  Number of Voids   


 


    Toilet 6 2 


 


    Urinal 1 1 








Slightly anxious but less tremulous than yesterday.  Diaphoretic.


JVP 10-12 cm water.  Tachycardic regular rhythm without murmur or gallop.


Lungs clear bilaterally without wheeze rhonchi rales


Improving 1+ pitting pedal edema


Result Diagrams: 


 01/25/19 03:20





 01/27/19 03:15


Telemetry: 





Sinus tachycardia





ICD10 Worksheet


Patient Problems: 


 Problems











Problem Status Onset


 


CHF (congestive heart failure) Acute

## 2019-01-28 RX ADMIN — Medication SCH MG: at 15:54

## 2019-01-28 RX ADMIN — Medication SCH MG: at 08:37

## 2019-01-28 RX ADMIN — FUROSEMIDE SCH MG: 40 TABLET ORAL at 15:54

## 2019-01-28 RX ADMIN — SPIRONOLACTONE SCH MG: 25 TABLET, FILM COATED ORAL at 08:37

## 2019-01-28 RX ADMIN — LISINOPRIL SCH MG: 5 TABLET ORAL at 08:37

## 2019-01-28 RX ADMIN — CARVEDILOL SCH MG: 6.25 TABLET, FILM COATED ORAL at 08:37

## 2019-01-28 RX ADMIN — CARVEDILOL SCH MG: 6.25 TABLET, FILM COATED ORAL at 18:03

## 2019-01-28 RX ADMIN — FUROSEMIDE SCH MG: 10 INJECTION, SOLUTION INTRAMUSCULAR; INTRAVENOUS at 08:38

## 2019-01-28 RX ADMIN — MENTHOL SCH PATCH: 1 SPRAY TOPICAL at 18:07

## 2019-01-28 NOTE — PDCARPN
Cardiology Progress Note


Assessment/Plan: 


Assessment/plan:  39-year-old male with acute systolic heart failure, 

hypertension, probable sleep apnea, past opiate abuse and ongoing alcohol 

abuse.  Admitted on 01/24 for right and left heart catheterization after 

outpatient echo showed severely depressed ejection fraction of 10% and he was 

clinically in florid heart failure.  Catheterization demonstrated markedly 

elevated wedge pressure, preserved cardiac output, normal coronaries.  He has 

diuresed approximately 12 kg since admission.





1. Acute systolic heart failure:  Etiology is likely related to heavy alcohol 

use, sleep apnea, and perhaps hypertension.  He does have valvular disease that 

I believe is secondary to annular dilation and not the primary etiology of his 

cardiomyopathy.  Transition to oral lasix.  Spironolactone was added January 26th.  He is on low-dose lisinopril.  He tolerated Coreg uptitration.  Of note, 

TSH was normal on 01/18.  His iron studies show an elevated ferritin and iron 

saturation 45%.  Would recommend outpatient hematology evaluation to 

definitively rule out hemochromatosis.  1500 cc fluid restriction and low salt 

diet.  





2. Alcohol abuse:  In the hospital he initially required three times daily 

vodka and p.r.n. Ativan/CIWA. No alcohol since 1/26.  Initially he was 

recalcitrant to the idea of alcohol cessation, but now says he would consider.  

He does understand that his alcohol abuse is significantly impacting his heart 

failure.  Best would be for him to stay and complete the process of coming off 

EtOH by stopping the alcohol, using ativan only and then tapering ativan.  The 

major concern is life threatening withdrawal or return to heavy drinking.  

Continue thiamine.  





3. Hypertension:  Much improved with Coreg and lisinopril.  





4. Valvular heart disease:  This is likely secondary to his LV dilation.  Will 

need serial follow-up.





5. Likely sleep apnea:  Outpatient evaluation.  





Approximately 35 min was spent in direct patient care today, reviewing the plan

, explaining rationale behind drug therapy, and trying to help the patient 

understand that long-term follow-up will be absolutely necessary for his 

improved cardiovascular health.  





From a cardiac standpoint he is stable for discharge, but may need 1-2 more 

days to complete ativan taper.  











01/28/19 10:44





Subjective: 





Denies SOB, CP, palpitations, presyncope.  Would like to go home. 


Reviewed/Discussed With: family, multidisciplinary team


Time Spent with Patient: greater than 25 minutes


Time Spent with Patient: Greater than 25 minutes spent on this patients care, 

greater than 50% of time spent counseling, educating, and coordinating care 

regarding the above mentioned plan.


Objective: 





 Vital Signs (8 Hrs)











  Temp Pulse Resp BP Pulse Ox


 


 01/28/19 07:42  36.6 C  113 H  18  120/91 H  94


 


 01/28/19 04:00  36.3 C  108 H  14  126/96 H  93








 Intake/Output (24 Hrs)











 01/27/19 01/28/19 01/29/19





 05:59 05:59 05:59


 


Intake Total 1040 1750 


 


Output Total 1550 1925 


 


Balance -510 -175 


 


Intake:   


 


  Oral (ml) 1040 1550 


 


  IV Infused (ml)  200 


 


    Protocol Magnesium 1 dose  100 





    (See Protocol) IV AD PRN   





    Rx#:[id number]   


 


    Thiamine HCl 500 mg In Ns  100 





    100 ml @ 210 mls/hr IV   





    DAILY CLINT Rx#:[id number]   


 


Output:   


 


  Urine (ml) 1550 1925 


 


    Urinal 1550 1925 


 


Other:   


 


  Weight 113.1 kg 115.9 kg 


 


  Intake Quantity  Yes 





  Sufficient   


 


  Output Comment   


 


    Catheter  Female External Catheter 


 


  Number of Voids   


 


    Toilet 2 3 


 


    Urinal 1  


 


  Number of Stools   


 


    Urinal  1 








NAD, less tremulous


JVP 10


RRR no m/r/g


Lungs CTAB


Improved 1+ edema to mid shin bilaterally


Result Diagrams: 


 01/25/19 03:20





 01/28/19 03:40


Telemetry: 





NSR and sinus tachycardia





ICD10 Worksheet


Patient Problems: 


 Problems











Problem Status Onset


 


CHF (congestive heart failure) Acute

## 2019-01-28 NOTE — ASMTCMCOM
CM Note

 

CM Note                       

Notes:

1/28/2019 Case Management Note



Discussed pt during rounds.  Wife Elise was present.   Pt continues to withdraw from ETOH, diuresis 

continues.



Met w/pt twice today to discuss discharge plan.  Wife Elise was present for first meeting.  Discussed 


intensive outpatient group therapy through [facility].  Pt declined.



Met w/pt this afternoon.  Pt considering one on one counseling.  Pt confirmed Elise is a resource for 


discharge planning.  Encouraged pt to discuss with Elise and use [employer] jeffry to find 

providers.  Requested appointment be set prior to discharge.  Pt uncertain of which path forward he 


will chose.  Pt unsure if alcohol cessation is primary goal.



Case Management d/c poc:  to be determined.



Case Management to follow.

 

Date Signed:  01/28/2019 04:08 PM

Electronically Signed By:Monalisa Winchester RN

## 2019-01-28 NOTE — HOSPPROG
Hospitalist Progress Note


Assessment/Plan: 





40yo M with history of alcohol and opiate abuse presented from cardiology 

clinic with significant weigh gain and edema found to have LVEF of 10%.





1. Acute systolic CHF: New diagnosis. Likely etoh-related. C clean. Weight 

down 10-12kg this admission.


   - Cardiology primary


   - Switched from IV to PO lasix today, follow I/Os and daily weights


   - Continue lisinopril, coreg, spironolactone


2. Etoh dependence and withdrawal: This is day 4. Required 8mg ativan yesterday

, 6mg in the two days prior, which is concerning that his benzo requirements 

have gone up.  


   - Lengthy discussion with him today re: risks of leaving prior to completion 

of withdrawal. He is willing to stay overnight


   - Continue CIWA


   - Involve CM for outpatient resources (AA, outpt rehab, etc)


3. Hyponatremia: Dropped with diuresis. Monitor.


4. Abnormal LFTs: Consistent with congestive hepatopathy. Improving.


5. Obesity: Needs outpatient sleep study.


6. Elevated ferritin: Mild and not at a level that causes end-organ damage 

typically. Should have outpt follow up.


7. Opiate use disorder: In remission. Continue home suboxone.





VTE ppx: SCDs, ambulation


Code: full


Diet: cardiac


Dispo: Remain inpatient for monitoring of diuresis and etoh withdrawal, likely 

will be stable for dc within next day (cardiology primary)


Subjective: Feeling well, wanting to go home. Denies shortness of breath, chest 

pain. Leg swelling much better.


Objective: 


 Vital Signs











Temp Pulse Resp BP Pulse Ox


 


 36.6 C   115 H  18   101/75   95 


 


 01/28/19 07:42  01/28/19 12:00  01/28/19 12:00  01/28/19 12:00  01/28/19 12:00








 Laboratory Results





 01/25/19 03:20 





 01/28/19 03:40 





 











 01/27/19 01/28/19 01/29/19





 05:59 05:59 05:59


 


Intake Total 1040 1750 


 


Output Total 1550 1925 


 


Balance -510 -175 








 











PT  16.1 SEC (12.0-15.0)  H  01/24/19  11:15    


 


INR  1.27  (0.83-1.16)  H  01/24/19  11:15    














- Physical Exam


Constitutional: no apparent distress


Eyes: PERRL, anicteric sclera, EOMI


Ears, Nose, Mouth, Throat: moist mucous membranes, hearing normal, ears appear 

normal, no oral mucosal ulcers


Cardiovascular: regular rate and rhythym, systolic murmur, edema (1+ at ankles)

, No JVD


Respiratory: no respiratory distress, no rales or rhonchi, clear to auscultation


Gastrointestinal: normoactive bowel sounds, soft, non-tender abdomen, no 

palpable masses


Genitourinary: no bladder fullness, no bladder tenderness, no renal bruits


Skin: no rashes or abrasions, no fluctuance, no induration


Musculoskeletal: full muscle strength, no muscle tenderness, normal joint ROM


Neurologic: AAOx3, sensation intact bilaterally


Psychiatric: interacting appropriately, not anxious, not encephalopathic, 

thought process linear





ICD10 Worksheet


Patient Problems: 


 Problems











Problem Status Onset


 


CHF (congestive heart failure) Acute

## 2019-01-29 VITALS — DIASTOLIC BLOOD PRESSURE: 87 MMHG | SYSTOLIC BLOOD PRESSURE: 118 MMHG

## 2019-01-29 RX ADMIN — CARVEDILOL SCH MG: 6.25 TABLET, FILM COATED ORAL at 08:30

## 2019-01-29 RX ADMIN — LISINOPRIL SCH MG: 5 TABLET ORAL at 08:31

## 2019-01-29 RX ADMIN — FUROSEMIDE SCH MG: 40 TABLET ORAL at 08:31

## 2019-01-29 RX ADMIN — Medication SCH MG: at 08:31

## 2019-01-29 RX ADMIN — SPIRONOLACTONE SCH MG: 25 TABLET, FILM COATED ORAL at 08:31

## 2019-01-29 NOTE — ASMTCMCOM
CM Note

 

CM Note                       

Notes:

Patient medically ready for discharge today.  Met with patient to finalize disposition plan.  CM on 


previous day provided resources and instruction on outpatient alcohol therapy.



As a follow-up to yesterday's interaction, CM offered further resources and assistance.  Patient 

was hesitant to agree to treatment and did not want to engage fully in the conversation.  He was 

able to articulate his plan to follow-up with an individual counselor and states he has already 

done some research.  D/W RN.  Wife will transport home.



Plan:  D/C home independently with follow-up as stated above. 

 

Date Signed:  01/29/2019 10:19 AM

Electronically Signed By:Leah Smith RN

## 2019-01-29 NOTE — HOSPPROG
Hospitalist Progress Note


Assessment/Plan: 





40yo M with history of alcohol and opiate abuse presented from cardiology 

clinic with significant weigh gain and edema found to have LVEF of 10%.





1. Acute systolic CHF: New diagnosis. Likely etoh-related. [facility] clean. Weight 

down 12-14kg this admission.


   - Cardiology primary


   - Diuresing well on PO lasix


   - Continue lisinopril, coreg, spironolactone


2. Etoh dependence and withdrawal: Has not received any benzodiazepines in >24 

hours, no signs of withdrawal.


   - I think he is safe to discharge from an etoh withdrawal stand point


   - Case management and myself have met with patient/wife and relayed 

importance of etoh cessation and given resources (AA, outpatient rehab, etc). 

He informed me this morning that he has applied for an appointment with an 

outpatient psychologist and plans to re-convene with Leonardo Antoine.


3. Hyponatremia: Improving. 


4. Abnormal LFTs: Consistent with congestive hepatopathy. Improving.


5. Obesity, nocturnal hypoxia: Needs outpatient sleep study.


6. Elevated ferritin: Mild and not at a level that causes end-organ damage 

typically. Should have outpt follow up.


7. Opiate use disorder: In remission. Continue home suboxone.





VTE ppx: SCDs, ambulation


Code: full


Diet: cardiac


Dispo: ok to discharge from medicine stand point. Discussed with cardiologist 

Mili Bruner.


Subjective: Ready to go home. Breathing well, swelling has almost resolved in 

legs. No shakes/sweating or nausea. Tolerating PO.


Objective: 


 Vital Signs











Temp Pulse Resp BP Pulse Ox


 


 37.1 C   103 H  19   118/87 H  94 


 


 01/29/19 07:53  01/29/19 07:53  01/29/19 07:53  01/29/19 07:53  01/29/19 07:53








 Laboratory Results





 01/25/19 03:20 





 01/29/19 03:04 





 











 01/28/19 01/29/19 01/30/19





 05:59 05:59 05:59


 


Intake Total 1750 700 


 


Output Total 1925 1750 


 


Balance -175 -1050 








 











PT  16.1 SEC (12.0-15.0)  H  01/24/19  11:15    


 


INR  1.27  (0.83-1.16)  H  01/24/19  11:15    














- Physical Exam


Constitutional: no apparent distress, appears nourished


Eyes: PERRL, anicteric sclera, EOMI


Ears, Nose, Mouth, Throat: moist mucous membranes, hearing normal, ears appear 

normal, no oral mucosal ulcers


Cardiovascular: systolic murmur, tachycardia, edema (trace BLE), No JVD


Respiratory: no respiratory distress, no rales or rhonchi, clear to auscultation


Gastrointestinal: normoactive bowel sounds, soft, non-tender abdomen, no 

palpable masses


Genitourinary: no bladder fullness, no bladder tenderness, no renal bruits


Skin: no rashes or abrasions, no fluctuance, no induration


Musculoskeletal: full muscle strength, no muscle tenderness, normal joint ROM


Neurologic: AAOx3, sensation intact bilaterally


Psychiatric: interacting appropriately, not anxious, not encephalopathic, 

thought process linear





ICD10 Worksheet


Patient Problems: 


 Problems











Problem Status Onset


 


CHF (congestive heart failure) Acute

## 2019-01-29 NOTE — ASMTLACE
LACE

 

Length of stay for            Answers:  4-6 days                              

current admission                                                             

Acuity / Level of             Answers:  Yes                                   

Care: Did the patient                                                         

have an inpatient                                                             

admission?                                                                    

Comorbidities - select        Answers:  Congestive heart failure              

all that apply                                                                

# of Emergency department     Answers:  1-2                                   

visits in the last 6                                                          

months                                                                        

Social determinants           Answers:  History of substance                  

                                        abuse (ETOH, street                   

                                        drugs, prescription                   

                                        drugs, etc.)                          

Score: 13

 

Date Signed:  01/29/2019 10:14 AM

Electronically Signed By:Leah Smith RN

## 2019-01-29 NOTE — ASDISCHSUM
----------------------------------------------

Discharge Information

----------------------------------------------

Plan Status:Home with No Needs                       Medically Cleared to Leave:

Discharge Date:                                       D/C Disposition:Home, Routine, Self-Care

ADT D/C Disposition:Home, Routine, Self-Care         Projected Discharge Date:01/25/2019 11:00 AM

Transportation at D/C:Family                         Discharge Delay Reason:

Follow-Up Date:01/25/2019 11:00 AM                   Discharge Slot:

Final Diagnosis:

----------------------------------------------

Placement Information

----------------------------------------------

Referral Type:Palliative Care                        Referral ID:[id number]

Provider Name:

Address 1:                                           Phone Number:

Address 2:                                           Fax Number:

City:                                                Selection Factors:

State:

 

----------------------------------------------

Patient Contact Information

----------------------------------------------

Contact Name:ANISHACALOSMOOSE                          Relationship:Wife

Address:[address] [place] [address]                            Home [phone]

                                                     Work [phone]

City:[place]                                      Alternate Phone:

[facility]/Zip Code:[place] [address]                              Email:

----------------------------------------------

Financial Information

----------------------------------------------

Financial Class:"[employer]mars[facility]

Primary Plan Desc:[facility]       Primary Plan Number:[id number]

Secondary Plan Desc:                                 Secondary Plan Number:

 

 

----------------------------------------------

Assessment Information

----------------------------------------------

----------------------------------------------

[facility] CM Progress Note

----------------------------------------------

CM Note

 

CM Note                       

Notes:

1/25/2019 Case Management Note



Pt admitted for CHF and ETOH withdrawl.  Met w/pt in the morning.  Pt unwilling to engage in 

conversation about alcohol consumption.  Pt has completed inpatient rehab twice at [facility] 

approximately 6 years ago.  Pt is currently on suboxone through Dr. Carlos Jay [facility] [place].   At this 

time pt refuses resources and does not want to stop drinking.



Met w/janie Olivares [phone].  Elise reports pt achieved sobriety for 3 - 6 months after [facility] 

treatment and then started using alcohol.  Currently Elise estimates pt consumes 3 handles of vodka 

per week.  Pt drinks round the clock, per Elise, mixing vodka into gatorade during the day and 

switching to cocktails late in the afternoon. 



Arranged Palliative Care meeting with Janice for pt to discuss goals of care. 



Encouraged wife to use insurance benefits to find counselor for support.  Pt and wife are strong in 


their holli and plan to rely on their  for guidance.  Pt mother lives in [place] and is 

support for both, father passed away in his early 60's.



Wife is hopeful pt will consider inpatient rehab.  Wife explored [employer] coverage and pt has 

inpatient options including [facility], [place], [facility], [facility], [facility], [facility].  Informed wife that pt 

has to call and complete assessment screening for admittance to a program.



Case Management d/c poc:  to be determined.

 

 

Date Signed:  01/25/2019 04:22 PM

Electronically Signed By:Monalisa [place] RN

 

 

----------------------------------------------

[facility] PAULA Progress Note

----------------------------------------------

CM Note

 

CM Note                       

Notes:

Chart reviewed. Met with patient to review plan of care. 39 year old male with fulminant heart 

failure thought to be likely alcohol induced. We discussed his treatment options available as far 

as alcohol withdrawal and that he has in fact replaced his previous opiate addiction with 

alcohol. CM stressed to him that success at home "self tapering" alcohol was not likely to be 

successful and carried significant risk to his health. He is feeling that he has no control but 

expressed clearly he wants to go home and not seek withdrawal support here or at other inpatient 

facility. He reports he see's a psychiatrist for care. He denies depression and does not believe he 


self medicates with ETOH. He is tremulous despite his current doses of alcohol. PAULA explained that 

his medical team would feel it in his best interest to go through active withdrawal under medical 

supervision but again he is resistant. I have suggested that he make an action plan to demonstrate 

a sincere intent to stop alcohol by seeking follow up and choosing a program to help him. CM to 

follow for need.



Plan: TBD

 

Date Signed:  01/27/2019 12:13 PM

Electronically Signed By:Clarice Bustamante RN

 

 

----------------------------------------------

LACE

----------------------------------------------

LACE

 

Length of stay for            Answers:  4-6 days                              

current admission                                                             

Acuity / Level of             Answers:  Yes                                   

Care: Did the patient                                                         

have an inpatient                                                             

admission?                                                                    

Comorbidities - select        Answers:  Congestive heart failure              

all that apply                                                                

# of Emergency department     Answers:  1-2                                   

visits in the last 6                                                          

months                                                                        

Social determinants           Answers:  History of substance                  

                                        abuse (ETOH, street                   

                                        drugs, prescription                   

                                        drugs, etc.)                          

Score: 13

 

Date Signed:  01/29/2019 10:14 AM

Electronically Signed By:Leah Smith RN

 

 

----------------------------------------------

[facility] CM Progress Note

----------------------------------------------

CM Note

 

CM Note                       

Notes:

1/28/2019 Case Management Note



Discussed pt during rounds.  Wife Elise was present.   Pt continues to withdraw from ETOH, diuresis 

continues.



Met w/pt twice today to discuss discharge plan.  Wife Elise was present for first meeting.  Discussed 


intensive outpatient group therapy through [facility].  Pt declined.



Met w/pt this afternoon.  Pt considering one on one counseling.  Pt confirmed lEise is a resource for 


discharge planning.  Encouraged pt to discuss with Elise and use [employer] to find 

providers.  Requested appointment be set prior to discharge.  Pt uncertain of which path forward he 


will chose.  Pt unsure if alcohol cessation is primary goal.



Case Management d/c poc:  to be determined.



Case Management to follow.

 

Date Signed:  01/28/2019 04:08 PM

Electronically Signed By:Monalisa [place] RN

 

 

----------------------------------------------

[facility] CM Progress Note

----------------------------------------------

CM Note

 

CM Note                       

Notes:

Patient medically ready for discharge today.  Met with patient to finalize disposition plan.  CM on 


previous day provided resources and instruction on outpatient alcohol therapy.



As a follow-up to yesterday's interaction, CM offered further resources and assistance.  Patient 

was hesitant to agree to treatment and did not want to engage fully in the conversation.  He was 

able to articulate his plan to follow-up with an individual counselor and states he has already 

done some research.  D/W RN.  Wife will transport home.



Plan:  D/C home independently with follow-up as stated above. 

 

Date Signed:  01/29/2019 10:19 AM

Electronically Signed By:Leah Smith RN

 

 

----------------------------------------------

Intervention Information

----------------------------------------------

Intervention Type:*Incorrect Registration            Date of Service:01/25/2019 06:33 AM

Patient Type:Observation                             Staff Member:SATURNINO Hart, Opal

Hours:                                               Discipline:

Severity:                                            Comment:

## 2019-01-29 NOTE — GDS
[f rep st]



                                                             DISCHARGE SUMMARY





ADMISSION DIAGNOSES:  

1.  Acute systolic heart failure with severely depressed ejection fraction of 10% due to alcohol abus
e.

2.  Alcohol abuse.

3.  Hypertension.

4.  History of opioid abuse, now on Suboxone.

5.  Valvular heart disease.

6.  Likely sleep apnea.



DISCHARGE DIAGNOSES:  

1.  Acute systolic heart failure, improved with diuresis.

2.  Alcohol abuse status post detoxification with Ativan and not requiring Ativan in the past 24 hour
s.

3.  Hypertension.

4.  Valvular heart disease (mitral and tricuspid regurgitation).

5.  Likely sleep apnea.



PROCEDURES DURING ADMISSION:  

1.  Right and left heart catheterization with Dr. Ramirez on 01/24/2019.  Please see full report.  In 
summary:  Normal coronary arteries.  Ejection fraction 10%.  Wedge pressure 26.  Pulmonary pressure 6
3/31.  Calculated Sarah cardiac output 8.1 L/minute with an index of 3.2.

2.  Electrocardiogram:  Sinus tachycardia with minimal diffuse repolarization abnormalities.

3.  Outpatient echocardiogram not done during this admission:  Moderately dilated left ventricle with
 severely reduced ejection fraction of 10%.  Diffuse global hypokinesis.  Moderately dilated right ve
ntricle with moderately reduced RV systolic function.  Severe biatrial dilation.  Moderate mitral reg
urgitation and severe tricuspid regurgitation with estimated pulmonary pressure 43 mmHg.  Ascending a
dahlia is 3.9 cm.



HOSPITAL COURSE:  The patient is a 39-year-old male with a history of heavy alcohol abuse and past hi
story of opioid use, currently on Suboxone.  He is admitted after an outpatient cardiac cath showed s
everely elevated left and right heart filling pressures and ejection fraction of 10%.  He was diurese
d aggressively with IV Lasix and spironolactone.  His weight decreased 14 kg during admission with re
solution of edema and dyspnea. 



For the first 2-1/2 days of his hospital course, he received vodka three times daily per the patient'
s request as he was not ready to consider alcohol cessation.  After January 26th, he received only At
tremaine per [facility] protocol and was able to taper down on dose.  His last dose of Ativan was Monday, Janua
ry 28th at 1:30 a.m.  This was greater than 24 hours prior to discharge.  He was no longer tremulous,
 reported he was not anxious and reported that he was willing to continue abstinence from alcohol at 
home.



DISCHARGE PHYSICAL EXAM:  VITAL SIGNS:  Blood pressure 118/87, heart rate 103, oxygen saturation 94% 
on room air, he is afebrile.  GENERAL:  Well-appearing middle-aged male in no acute distress.  CARDIO
VASCULAR:  JVP 10 cm of water.  Regular rate and rhythm.  Slightly tachy.  No murmurs.  No S3.  LUNGS
:  Clear bilateral without wheeze, rhonchi, or rales.  EXTREMITIES:  Trace bilateral ankle edema.



DISCHARGE LABORATORIES:  Last CBC was on January 25th, normal other than a slight predominance of mon
ocytes.  INR on admission was 1.27.  Sodium 138, potassium 4.9, chloride 102, bicarb 29, BUN 24, crea
tinine 0.8.  His AST initially was 141 and then improved to 68.  ALT initially 94 and normalized to 6
3.  Total protein 6.2.  LDL cholesterol 101.  TSH was normal earlier this year. 



Normal iron TIBC and 45% iron saturation with elevated ferritin of 473.  Magnesium 1.9.



DISCHARGE MEDICATIONS:  Please see medication reconciliation.  To summarize:  New medications are car
vedilol 6.25 mg b.i.d., Lasix 40 mg once daily, lisinopril 5 mg daily, magnesium chloride 64 mg table
t once daily, spironolactone 25 mg daily, and thiamine 100 mg.  He will also continue his usual home 
dose of Suboxone.  He was not discharged on Ativan.



DISCHARGE INSTRUCTIONS:  

1.  Do not drink alcohol.

2.  Follow up with Dr. Leroy Null in [facility] as scheduled on February 4th at 3 p.m.

3.  Consultation at [facility] for probable sleep apnea.

4.  Establish relationship with outpatient alcohol management program as discussed in case management
 rounds.  The patient has previously been at [facility] and the patient's wife reports other resource
s that are available to them and the patient seems motivated to engage in this.

5.  Call [employer] with a weight gain of more than 2 pounds in 24 hours.  Weigh yourself daily.  
Call for chest pain, dyspnea, edema, palpitations, or syncope. 



The patient was currently discharged in stable condition to home.  Greater than 30 minutes was spent 
in coordination of care and direct patient contact.





Job #:  [id number]

## 2019-02-10 ENCOUNTER — HOSPITAL ENCOUNTER (OUTPATIENT)
Dept: HOSPITAL 80 - FCPNEURO | Age: 39
End: 2019-02-10
Attending: PSYCHIATRY & NEUROLOGY
Payer: COMMERCIAL

## 2019-02-10 DIAGNOSIS — G47.33: Primary | ICD-10-CM
